# Patient Record
Sex: MALE | Race: WHITE | URBAN - METROPOLITAN AREA
[De-identification: names, ages, dates, MRNs, and addresses within clinical notes are randomized per-mention and may not be internally consistent; named-entity substitution may affect disease eponyms.]

---

## 2017-06-12 ENCOUNTER — OFFICE VISIT (OUTPATIENT)
Dept: URGENT CARE | Facility: RETAIL CLINIC | Age: 67
End: 2017-06-12

## 2017-06-12 VITALS
SYSTOLIC BLOOD PRESSURE: 95 MMHG | HEART RATE: 107 BPM | OXYGEN SATURATION: 90 % | TEMPERATURE: 101.6 F | DIASTOLIC BLOOD PRESSURE: 59 MMHG

## 2017-06-12 DIAGNOSIS — Z53.9 ERRONEOUS ENCOUNTER--DISREGARD: Primary | ICD-10-CM

## 2017-06-12 NOTE — MR AVS SNAPSHOT
"              After Visit Summary   2017    Johan Molina    MRN: [id number]           Patient Information     Date Of Birth          1950        Visit Information        Provider Department      2017 7:30 PM Kristyn Sprague PA-C [facility] [place]        Care Instructions    Should be seen in ER tonight for evaluation of cough, fever, decreased 02 level and leg/arm cramps          Follow-ups after your visit        Who to contact     You can reach your care team any time of the day by calling [phone].  Notification of test results:  If you have an abnormal lab result, we will notify you by phone as soon as possible.         Additional Information About Your Visit        MyChart Information     [employer] lets you send messages to your doctor, view your test results, renew your prescriptions, schedule appointments and more. To sign up, go to [email or web address] . Click on \"Log in\" on the left side of the screen, which will take you to the Welcome page. Then click on \"Sign up Now\" on the right side of the page.     You will be asked to enter the access code listed below, as well as some personal information. Please follow the directions to create your username and password.     Your access code is: [email or web address]  Expires: 9/10/2017  7:55 PM     Your access code will  in 90 days. If you need help or a new code, please call your [place] clinic or [phone].        Care EveryWhere ID     This is your Care EveryWhere ID. This could be used by other organizations to access your [place] medical records  [id number]        Your Vitals Were     Pulse Temperature Pulse Oximetry             107 101.6  F (38.7  C) (Oral) 90%          Blood Pressure from Last 3 Encounters:   17 95/59    Weight from Last 3 Encounters:   No data found for Wt              Today, you had the following     No orders found for display       Primary Care Provider    None Specified       " No primary provider on file.        Thank you!     Thank you for choosing [facility]  for your care. Our goal is always to provide you with excellent care. Hearing back from our patients is one way we can continue to improve our services. Please take a few minutes to complete the written survey that you may receive in the mail after your visit with us. Thank you!             Your Updated Medication List - Protect others around you: Learn how to safely use, store and throw away your medicines at www.disposemymeds.org.          This list is accurate as of: 6/12/17  7:55 PM.  Always use your most recent med list.                   Brand Name Dispense Instructions for use    FLONASE NA

## 2017-06-13 NOTE — PATIENT INSTRUCTIONS
Should be seen in ER tonight for evaluation of cough, fever, decreased 02 level and leg/arm cramps

## 2017-06-13 NOTE — PROGRESS NOTES
Chief Complaint   Patient presents with     Abdominal Pain     became ill this afternoon     Chills     Cough     Sweats     abhishek horses in both arms, both legs at same time today      SUBJECTIVE:   Johan Molina is a 66 year old male presenting with a chief complaint of cough, sweats/chills and cramps in extremities.  Onset of symptoms was 3 hrs ago  Sudden onset  Current and Associated symptoms: cough, chills, sweats, cramps in both arms and legs, stomach discomfort    No past medical history on file.     Medications - unsure of names: 2 blood pressure meds, 1 for gout,        Allergies   Allergen Reactions     Bees         Social History   Substance Use Topics     Smoking status: Not on file     Smokeless tobacco: Not on file     Alcohol use Not on file       ROS:  Review of systems negative except as stated above.    OBJECTIVE:  BP 95/59 (BP Location: Left arm)  Pulse 107  Temp 101.6  F (38.7  C) (Oral)  SpO2 90%  General appearance - moderately ill appearing, sweating  Lungs: crackles in anterior lungs, decreased sounds    Referred to ER for further evaluation  No charge visit at [facility] today  Wife was called to come  at [facility] and they were going straight to [facility]      Kristyn Sprague PA-C  [facility] [place]

## 2017-06-13 NOTE — NURSING NOTE
Chief Complaint   Patient presents with     Abdominal Pain     became ill this afternoon     Chills     Cough     Sweats     abhishek horses in both arms, both legs at same time today       Initial BP 95/59 (BP Location: Left arm)  Pulse 107  Temp 101.6  F (38.7  C) (Oral)  SpO2 90% There is no height or weight on file to calculate BMI.  Medication Reconciliation: complete